# Patient Record
Sex: FEMALE | Race: WHITE | URBAN - METROPOLITAN AREA
[De-identification: names, ages, dates, MRNs, and addresses within clinical notes are randomized per-mention and may not be internally consistent; named-entity substitution may affect disease eponyms.]

---

## 2021-07-19 ENCOUNTER — APPOINTMENT (OUTPATIENT)
Dept: URBAN - METROPOLITAN AREA SURGERY 9 | Age: 31
Setting detail: DERMATOLOGY
End: 2021-07-19

## 2021-07-19 DIAGNOSIS — Z41.9 ENCOUNTER FOR PROCEDURE FOR PURPOSES OTHER THAN REMEDYING HEALTH STATE, UNSPECIFIED: ICD-10-CM

## 2021-07-19 PROCEDURE — OTHER COSMETIC CONSULTATION: BOTULINUM TOXIN: OTHER

## 2021-07-19 PROCEDURE — OTHER OTHER: OTHER

## 2021-07-19 PROCEDURE — OTHER BOTOX: OTHER

## 2021-07-19 NOTE — PROCEDURE: OTHER
Other (Free Text): Recommended:\\n4 units bunny lines\\n12 units glabella\\n4 units forehead\\n4 units (2 units each) periorbital
Detail Level: Simple
Note Text (......Xxx Chief Complaint.): This diagnosis correlates with the
Render Risk Assessment In Note?: no
Other (Free Text): 24 units = $308\\nDiscounted to $11/unit

## 2021-09-27 ENCOUNTER — APPOINTMENT (OUTPATIENT)
Dept: URBAN - METROPOLITAN AREA SURGERY 9 | Age: 31
Setting detail: DERMATOLOGY
End: 2021-09-27

## 2021-09-27 DIAGNOSIS — Z41.9 ENCOUNTER FOR PROCEDURE FOR PURPOSES OTHER THAN REMEDYING HEALTH STATE, UNSPECIFIED: ICD-10-CM

## 2021-09-27 PROCEDURE — OTHER BOTOX: OTHER

## 2021-09-27 PROCEDURE — OTHER OTHER: OTHER

## 2021-09-27 PROCEDURE — OTHER COSMETIC CONSULTATION: BOTULINUM TOXIN: OTHER

## 2021-09-27 NOTE — PROCEDURE: OTHER
Other (Free Text): 32 units = $352 (glabella - no procerus treated)\\nadded lip flip tx today (4U; can go up to 6U if needed)\\nDiscounted to $11/unit
Detail Level: Simple
Render Risk Assessment In Note?: no
Note Text (......Xxx Chief Complaint.): This diagnosis correlates with the

## 2021-09-27 NOTE — PROCEDURE: BOTOX
Price (Use Numbers Only, No Special Characters Or $): 945 Price (Use Numbers Only, No Special Characters Or $): 494